# Patient Record
Sex: FEMALE | Race: WHITE | Employment: OTHER | URBAN - METROPOLITAN AREA
[De-identification: names, ages, dates, MRNs, and addresses within clinical notes are randomized per-mention and may not be internally consistent; named-entity substitution may affect disease eponyms.]

---

## 2017-10-11 ENCOUNTER — OFFICE VISIT (OUTPATIENT)
Dept: OBGYN CLINIC | Facility: CLINIC | Age: 37
End: 2017-10-11

## 2017-10-11 VITALS
DIASTOLIC BLOOD PRESSURE: 64 MMHG | WEIGHT: 194 LBS | HEIGHT: 66 IN | BODY MASS INDEX: 31.18 KG/M2 | SYSTOLIC BLOOD PRESSURE: 122 MMHG

## 2017-10-11 DIAGNOSIS — N84.1 CERVICAL POLYP: ICD-10-CM

## 2017-10-11 DIAGNOSIS — Z30.433 ENCOUNTER FOR REMOVAL AND REINSERTION OF IUD: Primary | ICD-10-CM

## 2017-10-11 PROBLEM — Z97.5 IUD (INTRAUTERINE DEVICE) IN PLACE: Status: ACTIVE | Noted: 2017-10-11

## 2017-10-11 PROCEDURE — 88305 TISSUE EXAM BY PATHOLOGIST: CPT | Performed by: OBSTETRICS & GYNECOLOGY

## 2017-10-11 PROCEDURE — 99203 OFFICE O/P NEW LOW 30 MIN: CPT | Performed by: OBSTETRICS & GYNECOLOGY

## 2017-10-11 PROCEDURE — 87591 N.GONORRHOEAE DNA AMP PROB: CPT | Performed by: OBSTETRICS & GYNECOLOGY

## 2017-10-11 PROCEDURE — 58300 INSERT INTRAUTERINE DEVICE: CPT | Performed by: OBSTETRICS & GYNECOLOGY

## 2017-10-11 PROCEDURE — 87624 HPV HI-RISK TYP POOLED RSLT: CPT | Performed by: OBSTETRICS & GYNECOLOGY

## 2017-10-11 PROCEDURE — 87491 CHLMYD TRACH DNA AMP PROBE: CPT | Performed by: OBSTETRICS & GYNECOLOGY

## 2017-10-11 PROCEDURE — 57500 BIOPSY OF CERVIX: CPT | Performed by: OBSTETRICS & GYNECOLOGY

## 2017-10-11 PROCEDURE — 88175 CYTOPATH C/V AUTO FLUID REDO: CPT | Performed by: OBSTETRICS & GYNECOLOGY

## 2017-10-11 RX ORDER — COPPER 313.4 MG/1
1 INTRAUTERINE DEVICE INTRAUTERINE ONCE
Status: SHIPPED | OUTPATIENT
Start: 2017-10-11

## 2017-10-11 RX ORDER — CICLOPIROX 7.7 MG/G
GEL TOPICAL
Refills: 2 | COMMUNITY
Start: 2017-09-22

## 2017-10-11 RX ORDER — CLONAZEPAM 2 MG/1
2 TABLET ORAL 2 TIMES DAILY PRN
COMMUNITY

## 2017-10-11 NOTE — H&P
Beth Jaeger is here for a checkup. She is referred to me by Dr. Bessy Hooker. Beth Jaeger is 81-year-old  0 para 0.   Patient has had Mirena IUD since 2013 she would like to have it taken out because she feels she has gained a lot of weight since [employer] today's visit):    Current Outpatient Prescriptions:  ClonazePAM 2 MG Oral Tab Take 2 mg by mouth 2 (two) times daily as needed for Anxiety.  Disp:  Rfl:    Ciclopirox 0.77 % External Gel NIKITA EXT AA Q 12 H UTD Disp:  Rfl: 2       Allergies:  No Known Allerg [facility]  Obstetrics and Gynecology  IUD Insertion Procedure Note  Beverly Wright MD     IUD Insertion:     Pregnancy Results: negative from urine test   Birth control method(s) used: Mirena IUD    Consent signed.   Procedure discussed with ruddy

## 2017-10-13 ENCOUNTER — TELEPHONE (OUTPATIENT)
Dept: OBGYN CLINIC | Facility: CLINIC | Age: 37
End: 2017-10-13

## 2017-10-13 NOTE — TELEPHONE ENCOUNTER
Called patient and left a message on her voicemail telling her that pathology report on cervical polyp was benign. Also her Pap smear was normal HPV was negative.

## 2022-01-18 ENCOUNTER — TELEPHONE (OUTPATIENT)
Dept: SCHEDULING | Age: 42
End: 2022-01-18

## (undated) NOTE — LETTER
Wallace Berg, :1980    CONSENT FOR PROCEDURE/SEDATION    1. I authorize the performance upon Wallace Berg  the following: IUD REMOVAL REINSERTION     2.  I authorize Dr. González Rojo MD (and whomever is designated as the doctor’s assistant), to p Witness: _________________________________________ Date:___________     Physician Signature: _______________________________ Date:___________

## (undated) NOTE — LETTER
Yadira Duke, :1980    CONSENT FOR PROCEDURE/SEDATION    1. I authorize the performance upon Yadira Duke  the following: IUD Removal    2.  I authorize Dr. Davis Hawkins MD (and whomever is designated as the doctor’s assistant), to perform the ab Witness: _________________________________________ Date:___________     Physician Signature: _______________________________ Date:___________